# Patient Record
Sex: FEMALE | Race: WHITE
[De-identification: names, ages, dates, MRNs, and addresses within clinical notes are randomized per-mention and may not be internally consistent; named-entity substitution may affect disease eponyms.]

---

## 2019-06-03 ENCOUNTER — HOSPITAL ENCOUNTER (EMERGENCY)
Dept: HOSPITAL 63 - ER | Age: 54
Discharge: HOME | End: 2019-06-03
Payer: OTHER GOVERNMENT

## 2019-06-03 VITALS — BODY MASS INDEX: 23.53 KG/M2 | HEIGHT: 67 IN | WEIGHT: 149.91 LBS

## 2019-06-03 VITALS — DIASTOLIC BLOOD PRESSURE: 86 MMHG | SYSTOLIC BLOOD PRESSURE: 151 MMHG

## 2019-06-03 DIAGNOSIS — R07.89: Primary | ICD-10-CM

## 2019-06-03 DIAGNOSIS — Z88.1: ICD-10-CM

## 2019-06-03 DIAGNOSIS — Z90.710: ICD-10-CM

## 2019-06-03 DIAGNOSIS — M54.6: ICD-10-CM

## 2019-06-03 DIAGNOSIS — Z88.5: ICD-10-CM

## 2019-06-03 LAB
ALBUMIN SERPL-MCNC: 4 G/DL (ref 3.4–5)
ALBUMIN/GLOB SERPL: 1.1 {RATIO} (ref 1–1.7)
ALP SERPL-CCNC: 65 U/L (ref 46–116)
ALT SERPL-CCNC: 33 U/L (ref 14–59)
ANION GAP SERPL CALC-SCNC: 10 MMOL/L (ref 6–14)
AST SERPL-CCNC: 16 U/L (ref 15–37)
BASOPHILS # BLD AUTO: 0.1 X10^3/UL (ref 0–0.2)
BASOPHILS NFR BLD: 1 % (ref 0–3)
BILIRUB SERPL-MCNC: 0.3 MG/DL (ref 0.2–1)
BUN/CREAT SERPL: 16 (ref 6–20)
CA-I SERPL ISE-MCNC: 14 MG/DL (ref 7–20)
CALCIUM SERPL-MCNC: 9.6 MG/DL (ref 8.5–10.1)
CHLORIDE SERPL-SCNC: 104 MMOL/L (ref 98–107)
CO2 SERPL-SCNC: 29 MMOL/L (ref 21–32)
CREAT SERPL-MCNC: 0.9 MG/DL (ref 0.6–1)
EOSINOPHIL NFR BLD: 0.2 X10^3/UL (ref 0–0.7)
EOSINOPHIL NFR BLD: 3 % (ref 0–3)
ERYTHROCYTE [DISTWIDTH] IN BLOOD BY AUTOMATED COUNT: 13.7 % (ref 11.5–14.5)
GFR SERPLBLD BASED ON 1.73 SQ M-ARVRAT: 65 ML/MIN
GLOBULIN SER-MCNC: 3.8 G/DL (ref 2.2–3.8)
GLUCOSE SERPL-MCNC: 91 MG/DL (ref 70–99)
HCT VFR BLD CALC: 38.5 % (ref 36–47)
HGB BLD-MCNC: 12.9 G/DL (ref 12–15.5)
LYMPHOCYTES # BLD: 2.4 X10^3/UL (ref 1–4.8)
LYMPHOCYTES NFR BLD AUTO: 38 % (ref 24–48)
MCH RBC QN AUTO: 29 PG (ref 25–35)
MCHC RBC AUTO-ENTMCNC: 34 G/DL (ref 31–37)
MCV RBC AUTO: 87 FL (ref 79–100)
MONO #: 0.5 X10^3/UL (ref 0–1.1)
MONOCYTES NFR BLD: 8 % (ref 0–9)
NEUT #: 3.2 X10^3UL (ref 1.8–7.7)
NEUTROPHILS NFR BLD AUTO: 50 % (ref 31–73)
PLATELET # BLD AUTO: 351 X10^3/UL (ref 140–400)
POTASSIUM SERPL-SCNC: 3.6 MMOL/L (ref 3.5–5.1)
PROT SERPL-MCNC: 7.8 G/DL (ref 6.4–8.2)
RBC # BLD AUTO: 4.4 X10^6/UL (ref 3.5–5.4)
SODIUM SERPL-SCNC: 143 MMOL/L (ref 136–145)
WBC # BLD AUTO: 6.4 X10^3/UL (ref 4–11)

## 2019-06-03 PROCEDURE — 85025 COMPLETE CBC W/AUTO DIFF WBC: CPT

## 2019-06-03 PROCEDURE — 93005 ELECTROCARDIOGRAM TRACING: CPT

## 2019-06-03 PROCEDURE — 71045 X-RAY EXAM CHEST 1 VIEW: CPT

## 2019-06-03 PROCEDURE — 83880 ASSAY OF NATRIURETIC PEPTIDE: CPT

## 2019-06-03 PROCEDURE — 36415 COLL VENOUS BLD VENIPUNCTURE: CPT

## 2019-06-03 PROCEDURE — 80053 COMPREHEN METABOLIC PANEL: CPT

## 2019-06-03 PROCEDURE — 84484 ASSAY OF TROPONIN QUANT: CPT

## 2019-06-03 NOTE — RAD
EXAM: Chest, single view.

 

HISTORY: Chest pain.

 

COMPARISON: 11/23/2015

 

FINDINGS: A frontal view of the chest is obtained. There is no infiltrate,

pleural effusion or pneumothorax. The heart is normal in size. There are 

metallic clips overlying the right breast and axilla.

 

IMPRESSION: No acute pulmonary finding.

 

Electronically signed by: Citlali Braden MD (6/3/2019 7:37 PM) [facility]

## 2019-06-03 NOTE — EKG
[facility] [address], [place] [address]

Test Date:    2019               Test Time:    19:19:00

Pat Name:     ALEM SERRANO             Department:   

Patient ID:   [id number]           Room:          

Gender:       F                        Technician:   

:          [id number]               Requested By: RADHA LEAL

Order Number: [id number]            Reading MD:     

                                 Measurements

Intervals                              Axis          

Rate:         73                       P:            56

VA:           158                      QRS:          22

QRSD:         76                       T:            41

QT:           398                                    

QTc:          442                                    

                           Interpretive Statements

SINUS RHYTHM

NORMAL ECG

RI6.01

No previous ECG available for comparison

## 2019-06-03 NOTE — PHYS DOC
Past History


Past Medical History:  Other


Past Surgical History:  Hysterectomy


Alcohol Use:  Rarely


Drug Use:  None





Adult General


Chief Complaint


Chief Complaint:  CHEST PAIN





HPI


HPI


53-year-old female presents with left-sided chest pain that radiates into her 

jaw and upper back. The patient states that this pain started about 6 hours ago 

1 PM. It does seem to get worse with exertion or movement of her thoracic cage. 

She has not had shortness of breath or diaphoresis. The pain is a 3 out of 10 at

this time and is a sharp cramping. The patient had a similar episode to this one

month ago but did not come to the ED. Her PCP did some laboratory work but no 

stress test or cardiac catheterization. The patient's last stress test was in 

2014. It was reported to be clear. She does have an appointment with cardiology 

tomorrow. She denies fever or chills.





Review of Systems


Review of Systems





Constitutional: Denies fever or chills []


Eyes: Denies change in visual acuity, redness, or eye pain []


HENT: Denies nasal congestion or sore throat []


Respiratory: Denies cough or shortness of breath []


Cardiovascular: No additional information not addressed in HPI []


GI: Denies abdominal pain, nausea, vomiting, bloody stools or diarrhea []


: Denies dysuria or hematuria []


Musculoskeletal: Upper back pain.  Denies joint pain []


Integument: Denies rash or skin lesions []


Neurologic: Denies headache, focal weakness or sensory changes []


Endocrine: Denies polyuria or polydipsia []





All other systems were reviewed and found to be within normal limits, except as 

documented in this note.





Allergies


Allergies





Allergies








Coded Allergies Type Severity Reaction Last Updated Verified


 


  azithromycin Allergy Severe Shortness of Air 10/22/14 Yes


 


  ciprofloxacin Allergy Unknown  6/3/19 Yes


 


  oxycodone Allergy Unknown  6/3/19 Yes











Physical Exam


Physical Exam





Constitutional: Well developed, well nourished, no acute distress, non-toxic 

appearance. []


HENT: Normocephalic, atraumatic, bilateral external ears normal, oropharynx 

moist, no oral exudates, nose normal. []


Eyes: PERRLA, EOMI, conjunctiva normal, no discharge. [] 


Neck: Normal range of motion, no tenderness, supple, no stridor. [] 


Cardiovascular:Heart rate regular rhythm, no murmur []


Lungs & Thorax:  Bilateral breath sounds clear to auscultation []


Abdomen: Bowel sounds normal, soft, no tenderness, no masses, no pulsatile m

asses. [] 


Skin: Warm, dry, no erythema, no rash. [] 


Back: No tenderness, no CVA tenderness. [] 


Extremities: No tenderness, no cyanosis, no clubbing, ROM intact, no edema. [] 


Neurologic: Alert and oriented X 3, normal motor function, normal sensory 

function, no focal deficits noted. []


Psychologic: Affect normal, judgement normal, mood normal. []





EKG


EKG


Sinus rhythm, rate 73, normal axis, no ST elevations or depressions[]





Radiology/Procedures


Radiology/Procedures


[]


Impressions:


EXAM: Chest, single view.


 


HISTORY: Chest pain.


 


COMPARISON: 11/23/2015


 


FINDINGS: A frontal view of the chest is obtained. There is no infiltrate,


pleural effusion or pneumothorax. The heart is normal in size. There are 


metallic clips overlying the right breast and axilla.


 


IMPRESSION: No acute pulmonary finding.


 


Electronically signed by: Citlali Braden MD (6/3/2019 7:37 PM) [facility]














DICTATED AND SIGNED BY:     CITLALI BRADEN MD


DATE:     06/03/19 1937





CC: RADHA LEAL DO; SU CASTLE MD ~





Course & Med Decision Making


Course & Med Decision Making


Pertinent Labs and Imaging studies reviewed. (See chart for details)


Patient's labs are unremarkable. Her troponin is negative. Her chest x-ray is 

unremarkable. This does not appear to be cardiopulmonary in nature. I believe 

the patient should still follow-up with cardiology as previously scheduled 

tomorrow. Her HEART score is 2-3. She is stable for discharge at this time.


[]





Dragon Disclaimer


Dragon Disclaimer


This electronic medical record was generated, in whole or in part, using a voice

 recognition dictation system.





Departure


Departure:


Impression:  


   Primary Impression:  


   Chest pain


Disposition:  01 HOME, SELF-CARE


Condition:  STABLE


Referrals:  


SU CASTLE MD (PCP)


Patient Instructions:  Chest Pain (Nonspecific), Easy-to-Read





Problem Qualifiers








   Primary Impression:  


   Chest pain


   Chest pain type:  precordial pain  Qualified Codes:  R07.2 - Precordial pain








RADHA LEAL DO                  Andry 3, 2019 19:18

## 2019-07-29 ENCOUNTER — HOSPITAL ENCOUNTER (EMERGENCY)
Dept: HOSPITAL 63 - ER | Age: 54
LOS: 1 days | Discharge: HOME | End: 2019-07-30
Payer: OTHER GOVERNMENT

## 2019-07-29 VITALS — HEIGHT: 67 IN | BODY MASS INDEX: 24.57 KG/M2 | WEIGHT: 156.53 LBS

## 2019-07-29 VITALS — SYSTOLIC BLOOD PRESSURE: 161 MMHG | DIASTOLIC BLOOD PRESSURE: 102 MMHG

## 2019-07-29 DIAGNOSIS — S09.8XXA: ICD-10-CM

## 2019-07-29 DIAGNOSIS — Z88.5: ICD-10-CM

## 2019-07-29 DIAGNOSIS — S40.022A: Primary | ICD-10-CM
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
## 2019-07-29 DIAGNOSIS — Z88.1: ICD-10-CM
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
## 2019-07-29 PROCEDURE — 70450 CT HEAD/BRAIN W/O DYE: CPT

## 2019-07-29 NOTE — PHYS DOC
Past History


Past Medical History:  Cancer (right breast, treated with lumpectomy and 

radiation), Other


Past Surgical History:  Cancer Surgery (right-sided lumpectomy), Hysterectomy


Smoking:  Non-smoker


Alcohol Use:  Rarely


Drug Use:  None





Adult General


HPI


HPI





Patient is a 53-year-old female presents with left-sided head pain. Patient 2 

nights ago tripped and fell into a lamp and old stereo cabinet system. She had 

no loss of consciousness. Today had an increasing headache. She noted some 

bruising to her left shoulder. No numbness or tingling in the left shoulder. 

Patient is concerned about "head bleed." She notes floaters in her left eye 

since the injury. She noted a lump that is getting better. No significant 

improvement with ibuprofen today. Not worst headache of life. No weakness in 

arms or legs. No nausea or vomiting. She has a remote history of breast cancer[]





Review of Systems


Review of Systems





Constitutional: Denies fever or chills []


Eyes: Denies change in visual acuity, redness, or eye pain []


HENT: Denies nasal congestion or sore throat []


Respiratory: Denies cough or shortness of breath []


Cardiovascular: No chest pain or palpitations[]


GI: Denies abdominal pain, nausea, vomiting, bloody stools or diarrhea []


: Denies dysuria or hematuria []


Musculoskeletal: Denies back pain or joint pain []


Integument: Denies rash or skin lesions []


Neurologic: Denies focal weakness or sensory changes []


Endocrine: Denies polyuria or polydipsia []





All other systems were reviewed and found to be within normal limits, except as 

documented in this note.





Allergies


Allergies





Allergies








Coded Allergies Type Severity Reaction Last Updated Verified


 


  azithromycin Allergy Severe Shortness of Air 10/22/14 Yes


 


  ciprofloxacin Allergy Unknown  6/3/19 Yes


 


  oxycodone Allergy Unknown  6/3/19 Yes











Physical Exam


Physical Exam





Constitutional: Well developed, well nourished, no acute distress, non-toxic 

appearance. []


HENT: Normocephalic, atraumatic, bilateral external ears normal, oropharynx 

moist, no oral exudates, nose normal. []


Eyes: PERRLA, EOMI, conjunctiva normal, no discharge. Normal fundi bilaterally, 

no hyphema is present [] 


Neck: Normal range of motion, no tenderness, supple, no stridor. [] 


Cardiovascular:Heart rate regular rhythm, no murmur []


Lungs & Thorax:  Bilateral breath sounds clear to auscultation []


Abdomen: Bowel sounds normal, soft, no tenderness, no masses, no pulsatile 

masses. [] 


Skin: Warm, dry, no erythema, no rash. [] 


Back: No tenderness, no CVA tenderness. [] 


Extremities: Bruising over the left proximal lateral humerus. Full active range 

of motion.


The other 3 extremities show: No tenderness, no cyanosis, no clubbing, ROM 

intact, no edema. [] 


Neurologic: Alert and oriented X 3, normal motor function, normal sensory 

function, no focal deficits noted. []


Psychologic: Affect normal, judgement normal, mood normal. []





EKG


EKG


[]





Radiology/Procedures


Radiology/Procedures


PROCEDURE: CT HEAD WO CONTRAST





EXAM: CT Head without IV contrast


 


CLINICAL HISTORY: Left-sided head trauma, headache


 


COMPARISON: None.


 


TECHNIQUE:


Routine CT of the head without contrast. Soft tissues and bone windows 


were reviewed.


 


PQRS compliance statement - One or more of the following individualized 


dose reduction techniques were utilized for this study:


1.  Automated exposure control


2.  Adjustment of the mA and/or kV according to patient size


3.  Use of iterative reconstruction technique


 


FINDINGS:  


There is no evidence of hemorrhage, mass or extra-axial fluid collection.


 


Grey-white differentiation is maintained with no evidence of edema. 


 


There is no mass effect or shift of the intracranial structures.


 


The ventricles, basilar cisterns and cortical sulci are normal in size and


configuration for the patients stated age.


 


The cerebellum and brainstem are unremarkable.  


 


The calvarium demonstrates no evidence of fracture or focal lesion.


 


There is normal aeration of the visualized paranasal sinuses and mastoid 


air cells.


 


The visualized portions of the orbits are normal.


 


IMPRESSION:


No evidence for acute intracranial process.[]





Course & Med Decision Making


Course & Med Decision Making


Pertinent Labs and Imaging studies reviewed. (See chart for details)





Medical decision making: There is no evidence of intracranial mass or bleed. No 

evidence of a fracture. No evidence of nonaccidental trauma.[]





Dragon Disclaimer


Dragon Disclaimer


This electronic medical record was generated, in whole or in part, using a voice

 recognition dictation system.





Departure


Departure:


Impression:  


   Primary Impression:  


   Closed head injury


   Additional Impression:  


   Contusion of left arm


Disposition:  01 HOME, SELF-CARE


Condition:  IMPROVED


Referrals:  


SU CASTLE MD (PCP)


Follow-up in 2 days


Patient Instructions:  Contusion, Head Injury, Adult





Additional Instructions:  


Follow-up with your regular doctor in 2 days. Drink plenty of fluids. Return to 

the ER if worsening pain, weakness, or any other concerns.


Scripts


Meloxicam (MELOXICAM) 7.5 Mg Tablet


7.5 MG PO DAILY for PAIN, #20 TAB


   Prov: ANDREAS BENTON DO         7/30/19





Problem Qualifiers








   Primary Impression:  


   Closed head injury


   Encounter type:  initial encounter  Qualified Codes:  S09.90XA - Unspecified 

   injury of head, initial encounter


   Additional Impression:  


   Contusion of left arm


   Encounter type:  initial encounter  Qualified Codes:  S40.022A - Contusion of

    left upper arm, initial encounter








ANDREAS BENTON DO          Jul 29, 2019 23:45

## 2019-07-30 NOTE — RAD
EXAM: CT Head without IV contrast

 

CLINICAL HISTORY: Left-sided head trauma, headache

 

COMPARISON: None.

 

TECHNIQUE:

Routine CT of the head without contrast. Soft tissues and bone windows 

were reviewed.

 

PQRS compliance statement - One or more of the following individualized 

dose reduction techniques were utilized for this study:

1.  Automated exposure control

2.  Adjustment of the mA and/or kV according to patient size

3.  Use of iterative reconstruction technique

 

FINDINGS:  

There is no evidence of hemorrhage, mass or extra-axial fluid collection.

 

Grey-white differentiation is maintained with no evidence of edema. 

 

There is no mass effect or shift of the intracranial structures.

 

The ventricles, basilar cisterns and cortical sulci are normal in size and

configuration for the patients stated age.

 

The cerebellum and brainstem are unremarkable.  

 

The calvarium demonstrates no evidence of fracture or focal lesion.

 

There is normal aeration of the visualized paranasal sinuses and mastoid 

air cells.

 

The visualized portions of the orbits are normal.

 

IMPRESSION:

No evidence for acute intracranial process.

 

Electronically signed by: Theron Salmeron MD (7/30/2019 12:07 AM) [facility]

## 2021-02-21 ENCOUNTER — HOSPITAL ENCOUNTER (EMERGENCY)
Dept: HOSPITAL 63 - ER | Age: 56
Discharge: HOME | End: 2021-02-21
Payer: OTHER GOVERNMENT

## 2021-02-21 VITALS — HEIGHT: 67 IN | WEIGHT: 156.53 LBS | BODY MASS INDEX: 24.57 KG/M2

## 2021-02-21 VITALS — SYSTOLIC BLOOD PRESSURE: 176 MMHG | DIASTOLIC BLOOD PRESSURE: 97 MMHG

## 2021-02-21 DIAGNOSIS — R31.9: ICD-10-CM

## 2021-02-21 DIAGNOSIS — Z90.710: ICD-10-CM

## 2021-02-21 DIAGNOSIS — Z88.5: ICD-10-CM

## 2021-02-21 DIAGNOSIS — Z88.1: ICD-10-CM

## 2021-02-21 DIAGNOSIS — N39.0: Primary | ICD-10-CM

## 2021-02-21 DIAGNOSIS — Z87.440: ICD-10-CM

## 2021-02-21 DIAGNOSIS — Z88.2: ICD-10-CM

## 2021-02-21 LAB
APTT PPP: (no result) S
BACTERIA #/AREA URNS HPF: 0 /HPF
BILIRUB UR QL STRIP: (no result)
FIBRINOGEN PPP-MCNC: CLEAR MG/DL
GLUCOSE UR STRIP-MCNC: (no result) MG/DL
NITRITE UR QL STRIP: (no result)
RBC #/AREA URNS HPF: (no result) /HPF (ref 0–2)
SP GR UR STRIP: (no result)
SQUAMOUS #/AREA URNS LPF: (no result) /LPF
UROBILINOGEN UR-MCNC: (no result) MG/DL
WBC #/AREA URNS HPF: (no result) /HPF (ref 0–4)

## 2021-02-21 PROCEDURE — 81001 URINALYSIS AUTO W/SCOPE: CPT

## 2021-02-21 PROCEDURE — 99283 EMERGENCY DEPT VISIT LOW MDM: CPT

## 2021-02-21 PROCEDURE — 87086 URINE CULTURE/COLONY COUNT: CPT

## 2021-02-21 NOTE — PHYS DOC
Past History


Past Medical History:  Cancer, UTI, Other


Additional Past Medical Histor:  BREAST CANCER


Past Surgical History:  Cancer Surgery, Hysterectomy


Additional Past Surgical Histo:  LUMPECTOMY


Smoking:  Non-smoker


Alcohol Use:  Rarely


Drug Use:  None





Adult General


Chief Complaint


Chief Complaint:  URINARY FREQUENCY





HPI


HPI





Patient is a 55-year-old female with a past medical history significant for 

multiple UTIs in the past who presents with a chief complaint of dysuria and 

sense of urgency for couple days.  States she thinks she has a little bit of 

blood in her urine as well which is common for her.  Denies any recent 

illnesses, fevers, known ill contacts, chest pain, shortness of breath, 

abdominal pain, blood in the stool.  Denies any recent travel or trauma.





Review of Systems


Review of Systems


Review of systems otherwise unremarkable except noted in HPI.





Allergies


Allergies





Allergies








Coded Allergies Type Severity Reaction Last Updated Verified


 


  azithromycin Allergy Severe Shortness of Air 7/29/19 Yes


 


  ciprofloxacin Allergy Intermediate  2/21/21 Yes


 


  oxycodone Allergy Intermediate  2/21/21 Yes


 


  Sulfa (Sulfonamide Antibiotics) Adverse Reaction Mild  2/21/21 Yes











Physical Exam


Physical Exam





Constitutional: Well developed, well nourished, no acute distress, non-toxic 

appearance. []


Abdomen: Bowel sounds normal, soft, no tenderness, no masses, no pulsatile 

masses. [] 


Skin: Warm, dry, no erythema, no rash. [] 


Back: No tenderness, no CVA tenderness. [] 


Extremities: No tenderness, no cyanosis, no clubbing, ROM intact, no edema. [] 


Neurologic: Alert and oriented X 3, normal motor function, normal sensory 

function, no focal deficits noted. []


Psychologic: Affect normal, judgement normal, mood normal. []





Current Patient Data


Vital Signs





                                   Vital Signs








  Date Time  Temp Pulse Resp B/P (MAP) Pulse Ox O2 Delivery O2 Flow Rate FiO2


 


2/21/21 20:18 98.0 100 18 176/97 (123) 99 Room Air  











EKG


EKG


[]





Radiology/Procedures


Radiology/Procedures


[]





Heart Score


Risk Factors:


Risk Factors:  DM, Current or recent (<one month) smoker, HTN, HLP, family 

history of CAD, obesity.


Risk Scores:


Risk Factors:  DM, Current or recent (<one month) smoker, HTN, HLP, family 

history of CAD, obesity.





Course & Med Decision Making


Course & Med Decision Making


Patient is a 55-year-old female presents with a 2-day history of dysuria and 

sense of urgency, with possible hematuria


Vital signs notable for hypertension.  Physical exam noted above.  Urinalysis 

with hematuria, no bacteria 5-10 whites and some leukocyte esterase per lab.  

Laboratory staff states that because of the blood in there the test can 

sometimes be inconclusive.


Discussed findings with family who opted to go ahead and start on antibiotics 

and per imaging given his symptoms and she has had this in the past.  Advised to

 follow-up in the morning with her primary care to set up an appointment later 

this week for repeat testing.  Advised [place] to the ED with new or concerning 

symptoms.  Family grateful, verbalized understanding and agreed with plan of 

discharge.





[]





Dragon Disclaimer


Dragon Disclaimer


This electronic medical record was generated, in whole or in part, using a voice

 recognition dictation system.





Departure


Departure:


Impression:  


   Primary Impression:  


   Dysuria


   Additional Impressions:  


   Hematuria


   Urinary tract infection


Disposition:  01 DC HOME SELF CARE/HOMELESS


Condition:  GOOD


Referrals:  


SU CASTLE MD (PCP)





Additional Instructions:  


Please read all the attached information.  Please take your prescriptions as 

prescribed.  Please call your primary care physician first thing tomorrow to 

discuss your ED visit and set up a follow-up as needed.  Please come back to the

 ED with new or concerning symptoms as discussed.


Scripts


Cephalexin (CEPHALEXIN) 500 Mg Capsule


1 CAP PO TID for UTI for 5 Days, #15 CAP


   Prov: GIORGIO MAGALLANES MD         2/21/21 


Phenazopyridine Hcl (PYRIDIUM) 100 Mg Tablet


1 TAB PO TID for urinary discomfort for 2 Days, #6 TAB 0 Refills


   Prov: GIORGIO MAGALLANES MD         2/21/21 


Cephalexin (CEPHALEXIN) 500 Mg Capsule


1 CAP PO TID for UTI for 5 Days, #15 CAP


   Prov: GIORGIO MAGALLANES MD         2/21/21





Problem Qualifiers











GIORGIO MAGALLANES MD               Feb 21, 2021 20:41